# Patient Record
(demographics unavailable — no encounter records)

---
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
## 2024-10-15 NOTE — PHYSICAL EXAM
[Normal] : Regular, no tachycardia [Normal muscle bulk without asymmetry] : normal muscle bulk without asymmetry [Spinous Process Tenderness] : spinous process tenderness [Clark's Sign] : positive Clark's Sign [] : Motor: [Motor Strength Upper Extremities] : left (5/5) [___/5] : left ([unfilled]/5) [UE] : Sensory: Intact in bilateral upper extremities [de-identified] :  Anterior protrusion of cervical spine noted with slight torticolis [de-identified] : Severe limited range of motion on spinal extension and rotation

## 2024-10-15 NOTE — DATA REVIEWED
[FreeTextEntry1] : DEXA 9/18/24 - shows osteoporosis  ----------  Cervical MRI 9/24/24 IMPRESSION - Kyphotic deformity of the cervical spine. There is no evidence of instability on flexion extension positioning. - There is central disc herniation at C2-C3 and C3-C4 - Anterolisthesis of C4-C5 with diffuse disc bulging is also identified - Broad-based disc herniation at C5-C6 and central and broad-based disc herniation at C6-C7 is also identified.  - These findings are unchanged since prior examination - Redemonstration of nodular appearing lesion in the posterior aspect of the right thyroid lobe. Correlation with thyroid US is recommended    - Disc on flexion comes significantly close to spinal cord

## 2024-10-15 NOTE — PHYSICAL EXAM
[Normal] : Regular, no tachycardia [Normal muscle bulk without asymmetry] : normal muscle bulk without asymmetry [Spinous Process Tenderness] : spinous process tenderness [Clark's Sign] : positive Clark's Sign [] : Motor: [Motor Strength Upper Extremities] : left (5/5) [___/5] : left ([unfilled]/5) [UE] : Sensory: Intact in bilateral upper extremities [de-identified] :  Anterior protrusion of cervical spine noted with slight torticolis [de-identified] : Severe limited range of motion on spinal extension and rotation

## 2024-10-18 NOTE — REASON FOR VISIT
[Follow-Up Visit] : a follow-up visit for [Back Pain] : back pain [Neck Pain] : neck pain [FreeTextEntry2] : Images uploaded PACS
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
## 2024-10-22 NOTE — PHYSICAL EXAM
[PERRL With Normal Accommodation] : pupils were equal in size, round, and reactive to light [Examination Of The Oral Cavity] : the lips and gums were normal [Oropharynx] : the oropharynx was normal [Respiration, Rhythm And Depth] : normal respiratory rhythm and effort [Auscultation Breath Sounds / Voice Sounds] : lungs were clear to auscultation bilaterally [Chest Palpation] : palpation of the chest revealed no abnormalities [Heart Rate And Rhythm] : heart rate was normal and rhythm regular [Heart Sounds] : normal S1 and S2 [Murmurs] : no murmurs [Edema] : there was no peripheral edema [Bowel Sounds] : normal bowel sounds [Abdomen Soft] : soft [Abdomen Tenderness] : non-tender [Femoral Lymph Nodes Enlarged Bilaterally] : femoral [Inguinal Lymph Nodes Enlarged Bilaterally] : inguinal [No Spinal Tenderness] : no spinal tenderness [Musculoskeletal - Swelling] : no joint swelling seen [FreeTextEntry1] : cervical and lumbar paraspinal muscle tenderness, and limited range of motion of neck pain with lateral rotation healed scar below bl jaw line and also palpated hardware L shoulder healed , bl knee crepitus and tenderness with limited extnension but no effuison  [] : no rash
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
## 2024-10-22 NOTE — DATA REVIEWED
[FreeTextEntry1] : -updated  DEXA   9/10/.24  showed   :   Lumbar spine  T score -3.4  , left femoral neck -2.1 and LEft total hip  -1.7  showed OP of lumbar spine
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
## 2024-10-24 NOTE — PHYSICAL EXAM
[de-identified] : Constitutional: Well developed, in no acute distress, alert and oriented to person, place and time.   Respiratory: Non-labored breathing, no audible wheezes.   Cardiac: Regular, no tachycardia.   Musculoskeletal:. Anterior protrusion of cervical spine noted with slight torticolis.   Inspection: normal muscle bulk without asymmetry.   Palpation: spinous process tenderness.   Range of Motion: Severe limited range of motion on spinal extension and rotation.  Neurovascular: Motor: Elbow Flexion (C5, C6) - right (5/5) and left (5/5). Extension at Elbow (C6, C7, C8) - right (5/5) and left (5/5).  (C7, C8, T1) - right (4/5) and left (4/5). Finger ABduction (C8, T1) - right (3/5) and left (4/5). Sensory: Intact in bilateral upper extremities

## 2024-11-12 NOTE — HISTORY OF PRESENT ILLNESS
[Denies] : Denies [No] : No [N/A] : N/A [Declined] : Declined [Informed consent documented in EHR.] : Informed consent documented in EHR. [Right upper extremity] : Right upper extremity [22g] : 22g [Start Time: ___] : Medication Start Time: [unfilled] [End Time: ___] : Medication End Time: [unfilled] [IV discontinued. Intact. No signs or symptoms of IV complications noted. Time: ___] : IV discontinued. Intact. No signs or symptoms of IV complications noted. Time: [unfilled] [Patient  instructed to seek medical attention with signs and symptoms of adverse effects] : Patient  instructed to seek medical attention with signs and symptoms of adverse effects [Patient left unit in no acute distress] : Patient left unit in no acute distress [Medications administered as ordered and tolerated well.] : Medications administered as ordered and tolerated well. [de-identified] : 1:50pm
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
## 2024-12-04 NOTE — PHYSICAL EXAM
[Normal] : Regular, no tachycardia [Normal muscle bulk without asymmetry] : normal muscle bulk without asymmetry [Spinous Process Tenderness] : spinous process tenderness [Clark's Sign] : positive Clark's Sign [] : Motor: [Motor Strength Upper Extremities] : left (5/5) [___/5] : left ([unfilled]/5) [UE] : Sensory: Intact in bilateral upper extremities [de-identified] :  Anterior protrusion of cervical spine noted with slight torticolis [de-identified] : Severe limited range of motion on spinal extension and rotation
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
## 2024-12-04 NOTE — DISCUSSION/SUMMARY
[de-identified] :  I, Dr. Taylor personally performed the evaluation and management services for this established patient who presents today with (a) new problem(s)/exacerbation of (an) existing condition(s). That E/M includes conducting the clinically appropriate interval history &/or exam, assessing all new/exacerbated conditions, and establishing a new plan of care. Today, my FARHANA, Diana Kevin was here to observe my evaluation and management service for this new problem/exacerbated condition and follow the plan of care established by me going forward.
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
## 2024-12-04 NOTE — PHYSICAL EXAM
[de-identified] : General: No acute distress, conversant, well-nourished. Head: Normocephalic, atraumatic Neck: trachea midline, FROM Heart: normotensive and normal rate and rhythm Lungs: No labored breathing Skin: No abrasions, no rashes, no edema Psych: Alert and oriented to person, place and time Extremities: no peripheral edema or digital cyanosis Gait: Difficulty performing tandem gait. Vascular: warm and well perfused distally, palpable distal pulses MSK: Spine: No tenderness to palpation. No step-off, no deformity.  NEURO: Sensation Left C5 2/2 C6 2/2 C7 2/2 C8 2/2 T1 2/2  Right C5 2/2 C6 2/2 C7 2/2 C8 2/2 T1 2/2  Motor:  Left C5 (deltoid abduction) 5/5 C6 (biceps flexion) 5/5 C7 (triceps extension) 5/5 C8 (finger flexion) 5/5 T1 (interosseous) 5/5   Right C5 (deltoid abduction) 5/5 C6 (biceps flexion) 5/5 C7 (triceps extension) 5/5 C8 (finger flexion) 5/5 T1 (interosseous) 5/5  Sensation Left L2 - 2/2 Left L3 - 2/2 Left L4 - 2/2 Left L5 - 2/2 Left S1 - 2/2  Right L2 - 2/2 Right L3 - 2/2 Right L4 - 2/2 Right L5 - 2/2 Right S1 - 2/2  Motor: Left L2 (hip flexion) 5/5 Left L3 (knee extension) 5/5 Left L4 (ankle dorsiflexion) 5/5 Left L5 (long toe extensor) 5/5 Left S1 (ankle plantar flexion) 5/5  Right L2 (hip flexion) 5/5 Right L3 (knee extension) 5/5 Right L4 (ankle dorsiflexion) 5/5 Right L5 (long toe extensor) 5/5 Right S1 (ankle plantar flexion) 5/5  Reflexes: Normal and symmetric Negative Spurlings test. Negative Hoffmans reflex. Negative clonus. Down-going Babinski.
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
## 2025-05-13 NOTE — PHYSICAL EXAM
[PERRL With Normal Accommodation] : pupils were equal in size, round, and reactive to light [Examination Of The Oral Cavity] : the lips and gums were normal [Oropharynx] : the oropharynx was normal [Respiration, Rhythm And Depth] : normal respiratory rhythm and effort [Auscultation Breath Sounds / Voice Sounds] : lungs were clear to auscultation bilaterally [Chest Palpation] : palpation of the chest revealed no abnormalities [Heart Rate And Rhythm] : heart rate was normal and rhythm regular [Heart Sounds] : normal S1 and S2 [Murmurs] : no murmurs [Edema] : there was no peripheral edema [Bowel Sounds] : normal bowel sounds [Abdomen Soft] : soft [Abdomen Tenderness] : non-tender [Femoral Lymph Nodes Enlarged Bilaterally] : femoral [Inguinal Lymph Nodes Enlarged Bilaterally] : inguinal [No Spinal Tenderness] : no spinal tenderness [Musculoskeletal - Swelling] : no joint swelling seen [] : no rash [FreeTextEntry1] : cervical and lumbar paraspinal muscle tenderness, and limited range of motion of neck pain with lateral rotation healed scar below bl jaw line and also palpated hardware L shoulder healed , bl knee crepitus and tenderness with limited extnension but no effuison
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
